# Patient Record
Sex: MALE | Race: ASIAN
[De-identification: names, ages, dates, MRNs, and addresses within clinical notes are randomized per-mention and may not be internally consistent; named-entity substitution may affect disease eponyms.]

---

## 2023-03-05 ENCOUNTER — FORM ENCOUNTER (OUTPATIENT)
Age: 58
End: 2023-03-05

## 2023-03-29 ENCOUNTER — APPOINTMENT (OUTPATIENT)
Dept: HEPATOLOGY | Facility: CLINIC | Age: 58
End: 2023-03-29

## 2023-03-29 NOTE — HISTORY OF PRESENT ILLNESS
[FreeTextEntry1] : \par *PRE-CHARTING\par \par RFR: Cirrhosis\par Referring MD:\par Med/oncology: Dr. Ryder Perla (9/2021)\par \par Tip Wynn is a 59 y/o female with Hep B cirrhosis?\par \par [Other Medical Hx]  HTN, HLD, T2DM, HBV\par [Surgical Hx]\par [Social Hx] \par Alcohol:                Tobacco:      \par illicit drug:            Herb and dietary Supplement:  \par [FMH of liver disease]\par \par [Labs]\par \par [Imaging]\par CT Abdomen 5/20/21 enlargement of lateral segment left lobe liver and caudate lobe and relative atrophy medial segment of left\par lobe liver which can be seen in early or mild cirrhosis, splenomegaly measuring 14.7cm\par \par Bone marrow biopsy 9/8/21 unremarkable  \par \par [Procedures]\par EGD:\par Colonoscopy:\par \par [Plan]\par \par # \par - Education and counseling were provided to the patient.\par - Discussed at length with the patient that next course of action would depend on results\par \par # Health Maintenance\par

## 2023-05-19 ENCOUNTER — FORM ENCOUNTER (OUTPATIENT)
Age: 58
End: 2023-05-19

## 2023-06-28 ENCOUNTER — APPOINTMENT (OUTPATIENT)
Dept: HEPATOLOGY | Facility: CLINIC | Age: 58
End: 2023-06-28

## 2023-06-28 NOTE — HISTORY OF PRESENT ILLNESS
[FreeTextEntry1] : RFR: F2, Chronic hepatitis B\par Referring MD: DAGOBERTO. Dr. 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 on Vemlidy who is referred by GI for Hep B management.\par Fibroscan 3/2022 showed F2,S0 (No kPa available)\par \par [Other Medical Hx] HLD, HTN, T2DM\par [Surgical Hx]\par [Social Hx] \par Alcohol:                \par Tobacco:      \par illicit drug:            \par Herb and dietary Supplement:  \par [FMH of liver disease]\par \par [Current Medications] \par Vemlidy 25 mg\par amlodipine 5 mg\par famotidine 40 mg\par Janumet XR 50-1,000 mg\par Jardiance 25 mg\par quetiapine 300 mg\par simvastatin 10 mg\par valsartan 40 mg\par \par [Labs]\par Bloodwork 02/28/2022: AST 17, ALT 21, , HBV BeAb Reactive, HBV BeAg Non-Reactive\par \par [Imaging]\par Ultrasound/ Fibroscan 03/02/2022: increased hepatic echogenicity. This is a nonspecific finding which may be on the basis of fatty infiltration. Other hepatic processes such as hepatic fibrosis or cirrhosis cannot be excluded. Splenomegaly. The presence of splenomegaly may indicate associated portal hypertension in patient with cirrhosis. F2,S0.\par

## 2023-07-18 ENCOUNTER — APPOINTMENT (OUTPATIENT)
Dept: HEPATOLOGY | Facility: CLINIC | Age: 58
End: 2023-07-18
Payer: MEDICAID

## 2023-07-18 VITALS — HEIGHT: 67 IN | BODY MASS INDEX: 24.33 KG/M2 | WEIGHT: 155 LBS

## 2023-07-18 VITALS
HEART RATE: 96 BPM | RESPIRATION RATE: 16 BRPM | TEMPERATURE: 98.2 F | OXYGEN SATURATION: 96 % | DIASTOLIC BLOOD PRESSURE: 87 MMHG | SYSTOLIC BLOOD PRESSURE: 131 MMHG

## 2023-07-18 DIAGNOSIS — Z00.00 ENCOUNTER FOR GENERAL ADULT MEDICAL EXAMINATION W/OUT ABNORMAL FINDINGS: ICD-10-CM

## 2023-07-18 PROCEDURE — 99204 OFFICE O/P NEW MOD 45 MIN: CPT

## 2023-07-30 LAB
AFP-TM SERPL-MCNC: 2.4 NG/ML
ALBUMIN SERPL ELPH-MCNC: 4.8 G/DL
ALP BLD-CCNC: 75 U/L
ALT SERPL-CCNC: 19 U/L
ANION GAP SERPL CALC-SCNC: 14 MMOL/L
AST SERPL-CCNC: 17 U/L
BILIRUB SERPL-MCNC: 0.7 MG/DL
BUN SERPL-MCNC: 10 MG/DL
CALCIUM SERPL-MCNC: 10 MG/DL
CHLORIDE SERPL-SCNC: 101 MMOL/L
CO2 SERPL-SCNC: 27 MMOL/L
CREAT SERPL-MCNC: 0.82 MG/DL
EGFR: 102 ML/MIN/1.73M2
FERRITIN SERPL-MCNC: 141 NG/ML
GGT SERPL-CCNC: 17 U/L
GLUCOSE SERPL-MCNC: 246 MG/DL
HCV AB SER QL: NONREACTIVE
HCV S/CO RATIO: 0.09 S/CO
HEPATITIS A IGG ANTIBODY: REACTIVE
HEPB DNA PCR INT: NOT DETECTED
HEPB DNA PCR LOG: NOT DETECTED LOGIU/ML
INR PPP: 0.92 RATIO
IRON SATN MFR SERPL: 30 %
IRON SERPL-MCNC: 97 UG/DL
POTASSIUM SERPL-SCNC: 3.9 MMOL/L
PROT SERPL-MCNC: 7.2 G/DL
PT BLD: 10.7 SEC
SODIUM SERPL-SCNC: 142 MMOL/L
TIBC SERPL-MCNC: 322 UG/DL
UIBC SERPL-MCNC: 225 UG/DL

## 2023-07-30 NOTE — REVIEW OF SYSTEMS
[Fever] : no fever [Chills] : no chills [Feeling Tired] : not feeling tired [Eye Pain] : no eye pain [Earache] : no earache [Chest Pain] : no chest pain [Palpitations] : no palpitations [Shortness Of Breath] : no shortness of breath [Abdominal Pain] : no abdominal pain [Vomiting] : no vomiting [Constipation] : no constipation [Diarrhea] : no diarrhea [Heartburn] : no heartburn [Melena] : no melena [Limb Swelling] : no limb swelling [Itching] : no itching [Confused] : no confusion [Dizziness] : no dizziness [Anxiety] : no anxiety [Easy Bleeding] : no tendency for easy bleeding

## 2023-07-30 NOTE — ADDENDUM
[FreeTextEntry1] : 7/18/23 MELD3.0=6 AFP 2.4 INR 0.92 WBC 9.29 Hb 16.4  Na 142 K 3.9 Cr 0.82 Alb 4.8 TB 0.7 AST/ALT 17/19 ALP 75 GGT 17 HBV DNA not detected Neg: Iron sat 30% Ferritin 141   # Health Maintenance Immune to Hep A Hep C Ab Negative

## 2023-07-30 NOTE — HISTORY OF PRESENT ILLNESS
[FreeTextEntry1] : RFR: Cirrhosis\par Referring MD: DAGOBERTO Mathews\par PCP: Dr. Elias Koch\par \par Tip Wynn is a 58y male with HBeAg-Negative chronic hepatitis B on Vemlidy who is referred by Dr. Mathews for possible cirrhosis (splenomegaly) on ultrasound. Hep B was diagnosed in his 20s. Vemlidy started 5 years ago as a first Hep B pill. \par \par - Ultrasound elastography showed F2.\par - \par - EGD in 2017 no EV\par - Today he reports feeling well and denies any physical complaints. No signs of advanced liver disease on PE.\par \par [Other Medical Hx] HTN, HLD, DM\par [Surgical Hx] None\par [Social Hx]\par Alcohol: heavy ETOH in 20s, \par Tobacco: Quit  \par illicit drug: Denies \par Herb and dietary Supplement: Denies\par [FMH of liver disease] None\par (possibly pt's father  from ETOH cirrhosis c/b EV bleed)\par  \par [Medications]\par amlodipine 5 mg\par famotidine 40 mg\par Janumet XR 50-1,000 mg\par Jardiance 25 mg\par quetiapine 300 mg\par simvastatin 10 mg\par valsartan 40 mg\par Vemlidy 25 mg\par \par [Labs]\par  2022 \par AST 17, ALT 21, , HBV BeAb Reactive, \par HBV BeAg Non-Reactive\par \par [Imaging]\par Ultrasound/ Fibroscan done on 2022: increased hepatic echogenicity. This is a nonspecific finding which may be on the basis of fatty infiltration. Other hepatic processes such as hepatic fibrosis or cirrhosis cannot be excluded. Splenomegaly. The presence of splenomegaly may indicate associated portal hypertension in patient with cirrhosis. F2,S0.\par \par [Procedures]\par EGD, 9/15/2017 Dr. Mathews: Normal mucosa in the upper third of the esophagus, middle third of the esophagus and lower third of the esophagus. (Biopsy), Erythema in the antrum compatible with gastritis. (Biopsy), Erythema in the stomach body. (Biopsy) and Normal mucosa in the duodenal bulb and second part of the duodenum. (Biopsy)\par EGD, ~10 years ago, history of H. pylori s/p treatment\par \par Colonoscopy 2017: polypectomy \par

## 2023-07-30 NOTE — ASSESSMENT
Verified Results  TSH 28Mar2018 12:01AM YAAERMELINDA VICTOR MJASPREETMARTINA   [Mar 29, 2018 9:19PM KAYCEE VICTOR MJASPREETMARTINA]  Thyroid levels are clinically normal. Continue current dose of levothyroxine. WIll need repeat TSH and Free T4 in 6-9 months. This is to be done by adult endocrinology as discussed at visit.     Test Name Result Flag Reference   TSH 1.052 mcUnits/mL  0.463-4.130     T4, FREE 28Mar2018 12:01AM WALE BENOITMARU   [Mar 29, 2018 9:19PM KAYCEE VICTOR MJASPREETMARTINA]  Thyroid levels are clinically normal. Continue current dose of levothyroxine. WIll need repeat TSH and Free T4 in 6-9 months. This is to be done by adult endocrinology as discussed at visit.     Test Name Result Flag Reference   FREE T4 1.1 ng/dl  0.8-1.3       
[FreeTextEntry1] : [Plan]  # HBeAg-Negative chronic hepatitis B  # Splenomegaly # Recovered from ETOH liver disease in his 20s  Age 58 blood type A Will check MELD  - Continue Vemlidy, Refilled by Dr. Humphreys & Dr. Mathews - Education and counseling were provided to the patient. - Discussed at length with the patient that next course of action would depend on results - Emphasized the importance of compliance with medications and HCC screening - MRI to check hepatic portal vein. - Labs today - RTO in a month  # Health Maintenance   - Screen Hep A and C  * See the section of addendum below

## 2023-07-30 NOTE — PHYSICAL EXAM
[Splenomegaly] : splenomegaly [Non-Tender] : non-tender [General Appearance - Alert] : alert [General Appearance - In No Acute Distress] : in no acute distress [Sclera] : the sclera and conjunctiva were normal [Outer Ear] : the ears and nose were normal in appearance [] : no respiratory distress [Abdomen Tenderness] : non-tender [Abdomen Hernia] : no hernia was discovered [Abnormal Walk] : normal gait [Skin Color & Pigmentation] : normal skin color and pigmentation [Oriented To Time, Place, And Person] : oriented to person, place, and time [Impaired Insight] : insight and judgment were intact [Affect] : the affect was normal [Scleral Icterus] : No Scleral Icterus [Spider Angioma] : No spider angioma(s) were observed [Abdominal  Ascites] : no ascites [Jaundice] : No jaundice [Palmar Erythema] : no Palmar Erythema

## 2023-08-09 ENCOUNTER — OUTPATIENT (OUTPATIENT)
Dept: OUTPATIENT SERVICES | Facility: HOSPITAL | Age: 58
LOS: 1 days | End: 2023-08-09
Payer: MEDICAID

## 2023-08-09 ENCOUNTER — RESULT REVIEW (OUTPATIENT)
Age: 58
End: 2023-08-09

## 2023-08-09 ENCOUNTER — APPOINTMENT (OUTPATIENT)
Dept: MRI IMAGING | Facility: IMAGING CENTER | Age: 58
End: 2023-08-09

## 2023-08-09 DIAGNOSIS — Z00.8 ENCOUNTER FOR OTHER GENERAL EXAMINATION: ICD-10-CM

## 2023-08-09 PROCEDURE — A9585: CPT

## 2023-08-09 PROCEDURE — 74183 MRI ABD W/O CNTR FLWD CNTR: CPT

## 2023-08-09 PROCEDURE — 74183 MRI ABD W/O CNTR FLWD CNTR: CPT | Mod: 26

## 2023-08-22 ENCOUNTER — APPOINTMENT (OUTPATIENT)
Dept: HEPATOLOGY | Facility: CLINIC | Age: 58
End: 2023-08-22
Payer: MEDICAID

## 2023-08-22 DIAGNOSIS — R16.1 SPLENOMEGALY, NOT ELSEWHERE CLASSIFIED: ICD-10-CM

## 2023-08-22 PROCEDURE — 99214 OFFICE O/P EST MOD 30 MIN: CPT

## 2023-08-27 NOTE — ASSESSMENT
[FreeTextEntry1] : [Plan]  # HBeAg-Negative chronic hepatitis B  # Splenomegaly # Recovered from ETOH liver disease in his 20s  Age 58 blood type A Will check MELD  - Continue Vemlidy, Refilled by Dr. Humphreys & Dr. Mathews - Education and counseling were provided to the patient. - Discussed at length with the patient that next course of action would depend on results - Emphasized the importance of compliance with medications and HCC screening  MELD 3.0= 6 Labs in Feb 2024 Us abd in Feb 2024 RTO in  Feb 2024  # Health Maintenance Immune to Hep A Hep C Ab Negative

## 2024-02-14 ENCOUNTER — APPOINTMENT (OUTPATIENT)
Dept: HEPATOLOGY | Facility: CLINIC | Age: 59
End: 2024-02-14
Payer: MEDICAID

## 2024-02-14 VITALS
SYSTOLIC BLOOD PRESSURE: 129 MMHG | RESPIRATION RATE: 16 BRPM | TEMPERATURE: 97.5 F | BODY MASS INDEX: 23.96 KG/M2 | WEIGHT: 153 LBS | OXYGEN SATURATION: 95 % | DIASTOLIC BLOOD PRESSURE: 80 MMHG | HEART RATE: 90 BPM

## 2024-02-14 DIAGNOSIS — K74.00 HEPATIC FIBROSIS, UNSPECIFIED: ICD-10-CM

## 2024-02-14 DIAGNOSIS — K74.60 UNSPECIFIED CIRRHOSIS OF LIVER: ICD-10-CM

## 2024-02-14 PROCEDURE — 99214 OFFICE O/P EST MOD 30 MIN: CPT

## 2024-02-14 NOTE — PHYSICAL EXAM
[Splenomegaly] : splenomegaly [Non-Tender] : non-tender [General Appearance - Alert] : alert [General Appearance - In No Acute Distress] : in no acute distress [] : no respiratory distress [Abdomen Tenderness] : non-tender [Abnormal Walk] : normal gait [Abdomen Hernia] : no hernia was discovered [Skin Color & Pigmentation] : normal skin color and pigmentation [Oriented To Time, Place, And Person] : oriented to person, place, and time [Impaired Insight] : insight and judgment were intact [Affect] : the affect was normal [Scleral Icterus] : No Scleral Icterus [Abdominal  Ascites] : no ascites [Spider Angioma] : No spider angioma(s) were observed [Asterixis] : no asterixis observed [Jaundice] : No jaundice [Palmar Erythema] : no Palmar Erythema [Apical Impulse] : the apical impulse was normal [Heart Rate And Rhythm] : heart rate was normal and rhythm regular [Edema] : there was no peripheral edema [Bowel Sounds] : normal bowel sounds [Abdomen Soft] : soft [No Focal Deficits] : no focal deficits

## 2024-02-14 NOTE — HISTORY OF PRESENT ILLNESS
[FreeTextEntry1] : GI: Dr. Dominic Mathews PCP: Dr. Alyssa Koch  Mr. Wynn is a 60 yo male with hx of heavy ETOH use in his 20s and HBV cirrhosis on TAF, here for f/u care.  MRI of abdomen in 2023- cirrhosis and splenomegaly; F2, S0 on ultrasound elastography from 3/2022  EGD 23: Grade A esophagitis (refulux esophagitis), Erythema in the antrum (mod gastritis) Erythema in the stomach body. normal mucosa in the duodenal bulb and second part of the duodenum. Started omeprazole 40mg daily PRN. Patho-unremarkable.   Interim hx  - Last visit was 23  - TAF, taking as directed. med being filled by PCP  - US abd was done [facility] 1 week ago, report was not sent over  - Labs were done w/ PCP Dr. Koch, was told that "there are no issues. even with my liver numbers." Need to obtain reports  - "I feel healthier than before"  - Denies excessive fatigue, chest pain, SOB, palpitations, lightheadedness/ dizziness, melena, unintentional weight loss, acute hospitalizations or ER visits. Afebrile today. Appetite is good. Today he reports feeling well and denies any physical complaints. No signs of advanced liver disease on PE.  [Other Medical Hx] HTN, HLD, DM [Surgical Hx] None [Social Hx] Alcohol: heavy ETOH in 20s,  Tobacco: Quit   illicit drug: Denies  Herb and dietary Supplement: Denies [FMH of liver disease] None (possibly pt's father  from ETOH cirrhosis c/b EV bleed)   [Medications] amlodipine 5 mg omeprazole 40 mg PRN  Janumet XR 50-1,000 mg Jardiance 25 mg simvastatin 10 mg valsartan 40 mg Vemlidy 25 mg  [Labs] - Had b/w done 1-2 weeks ago, was told results were normal. Report not available  23 MELD3.0=6 AFP 2.4 INR 0.92 WBC 9.29 Hb 16.4  Na 142 K 3.9 Cr 0.82 Alb 4.8 TB 0.7 AST/ALT / ALP 75 GGT 17 HBV DNA not detected Neg: Iron sat 30% Ferritin 141  2022  AST 17, ALT 21, , HBV BeAb Reactive,  HBV BeAg Non-Reactive  [Imaging] Ultrasound/ Fibroscan done on 2022: increased hepatic echogenicity. This is a nonspecific finding which may be on the basis of fatty infiltration. Other hepatic processes such as hepatic fibrosis or cirrhosis cannot be excluded. Splenomegaly. The presence of splenomegaly may indicate associated portal hypertension in patient with cirrhosis. F2,S0.  [Procedures] EGD, 9/15/2017 Dr. Mathews: Normal mucosa in the upper third of the esophagus, middle third of the esophagus and lower third of the esophagus. (Biopsy), Erythema in the antrum compatible with gastritis. (Biopsy), Erythema in the stomach body. (Biopsy) and Normal mucosa in the duodenal bulb and second part of the duodenum. (Biopsy)  EGD, ~10 years ago, history of H. pylori s/p treatment  Colonoscopy 2017: polypectomy

## 2024-02-14 NOTE — ASSESSMENT
[FreeTextEntry1] : Mr. Wynn is a 59 y, w/ hx of heavy ETOH use in his 20s and HBV cirrhosis.   ABO: A  MELD 3.0 = 6 (7/2023) *need updated b/w report from PCP*   # HBeAg-Negative chronic hepatitis B  - On TAF, med being filled by PCP  - Splenomegaly on 3/2022 ABD US  - Recovered from ETOH liver disease in his 20s  - Continue Vemlidy, Refilled by Dr. Humphreys & Dr. Mathews - Education and counseling were provided to the patient. - Discussed at length with the patient that next course of action would depend on results - Emphasized the importance of compliance with medications and HCC screening  # Health Maintenance Immune to Hep A Hep C Ab Negative  [Plan]  - Will request recent bloodwork and ABD US from PCP and [facility]  - Obtain MRI in 6 mo for HCC screening - Repeat bloodwork in 6 mo - RTO in 6 mo after bloodwork and MRI   Will call patient and speak with him to review recent labs and ABD US and intervene as necessary.   Plans & discussions received well by patient. No outstanding questions or concerns at conclusion of visit. Patient was educated and informed on monitoring for s&s of worsening liver disease such as confusion, overt bleeding, vomiting blood, fluid buildup in abdomen (ascites), easy bruising, or fatigue. Encouraged to present to ER in the event of the above occurring and to call the office to notify. Patient provided the phone number for ALP to address questions/ concerns that may arise in the interim and instructed on how to contact language appropriate staff member.

## 2024-02-14 NOTE — REVIEW OF SYSTEMS
[Fever] : no fever [Chills] : no chills [Feeling Tired] : not feeling tired [Eye Pain] : no eye pain [Earache] : no earache [Chest Pain] : no chest pain [Palpitations] : no palpitations [Shortness Of Breath] : no shortness of breath [Abdominal Pain] : no abdominal pain [Vomiting] : no vomiting [Constipation] : no constipation [Diarrhea] : no diarrhea [Heartburn] : no heartburn [Melena] : no melena [Limb Swelling] : no limb swelling [Itching] : no itching [Confused] : no confusion [Dizziness] : no dizziness [Easy Bleeding] : no tendency for easy bleeding [Anxiety] : no anxiety

## 2024-02-22 ENCOUNTER — NON-APPOINTMENT (OUTPATIENT)
Age: 59
End: 2024-02-22

## 2024-02-23 LAB
AFP-TM SERPL-MCNC: 2.4 NG/ML
ALBUMIN SERPL ELPH-MCNC: 4.8 G/DL
ALP BLD-CCNC: 70 U/L
ALT SERPL-CCNC: 21 U/L
ANION GAP SERPL CALC-SCNC: 15 MMOL/L
AST SERPL-CCNC: 19 U/L
BILIRUB SERPL-MCNC: 0.6 MG/DL
BUN SERPL-MCNC: 17 MG/DL
CALCIUM SERPL-MCNC: 9.9 MG/DL
CHLORIDE SERPL-SCNC: 99 MMOL/L
CO2 SERPL-SCNC: 28 MMOL/L
CREAT SERPL-MCNC: 0.85 MG/DL
EGFR: 101 ML/MIN/1.73M2
GGT SERPL-CCNC: 12 U/L
GLUCOSE SERPL-MCNC: 263 MG/DL
HEPB DNA PCR INT: NOT DETECTED
HEPB DNA PCR LOG: NOT DETECTED LOGIU/ML
INR PPP: 0.81 RATIO
POTASSIUM SERPL-SCNC: 4.6 MMOL/L
PROT SERPL-MCNC: 7.4 G/DL
PT BLD: 9.3 SEC
SODIUM SERPL-SCNC: 141 MMOL/L

## 2024-05-16 DIAGNOSIS — B19.10 UNSPECIFIED VIRAL HEPATITIS B W/OUT HEPATIC COMA: ICD-10-CM

## 2024-05-16 RX ORDER — TENOFOVIR ALAFENAMIDE 25 MG/1
25 TABLET ORAL
Qty: 90 | Refills: 3 | Status: ACTIVE | COMMUNITY
Start: 2024-05-16 | End: 1900-01-01

## 2024-08-14 ENCOUNTER — APPOINTMENT (OUTPATIENT)
Dept: HEPATOLOGY | Facility: CLINIC | Age: 59
End: 2024-08-14

## 2024-08-14 VITALS
DIASTOLIC BLOOD PRESSURE: 77 MMHG | RESPIRATION RATE: 18 BRPM | OXYGEN SATURATION: 95 % | HEART RATE: 89 BPM | WEIGHT: 147 LBS | BODY MASS INDEX: 23.02 KG/M2 | SYSTOLIC BLOOD PRESSURE: 111 MMHG | TEMPERATURE: 97.7 F

## 2024-08-14 DIAGNOSIS — K74.60 UNSPECIFIED CIRRHOSIS OF LIVER: ICD-10-CM

## 2024-08-14 DIAGNOSIS — B19.10 UNSPECIFIED VIRAL HEPATITIS B W/OUT HEPATIC COMA: ICD-10-CM

## 2024-08-14 DIAGNOSIS — R16.1 SPLENOMEGALY, NOT ELSEWHERE CLASSIFIED: ICD-10-CM

## 2024-08-14 LAB
ALBUMIN SERPL ELPH-MCNC: 4.6 G/DL
ALP BLD-CCNC: 67 U/L
ALT SERPL-CCNC: 12 U/L
ANION GAP SERPL CALC-SCNC: 16 MMOL/L
AST SERPL-CCNC: 15 U/L
BASOPHILS # BLD AUTO: 0.09 K/UL
BASOPHILS NFR BLD AUTO: 1.2 %
BILIRUB SERPL-MCNC: 0.6 MG/DL
BUN SERPL-MCNC: 11 MG/DL
CALCIUM SERPL-MCNC: 9.4 MG/DL
CHLORIDE SERPL-SCNC: 101 MMOL/L
CO2 SERPL-SCNC: 26 MMOL/L
CREAT SERPL-MCNC: 0.79 MG/DL
EGFR: 103 ML/MIN/1.73M2
EOSINOPHIL # BLD AUTO: 0.48 K/UL
EOSINOPHIL NFR BLD AUTO: 6.2 %
GLUCOSE SERPL-MCNC: 148 MG/DL
HCT VFR BLD CALC: 43.7 %
HGB BLD-MCNC: 15.4 G/DL
IMM GRANULOCYTES NFR BLD AUTO: 0.5 %
LYMPHOCYTES # BLD AUTO: 2.1 K/UL
LYMPHOCYTES NFR BLD AUTO: 26.9 %
MAN DIFF?: NORMAL
MCHC RBC-ENTMCNC: 30.9 PG
MCHC RBC-ENTMCNC: 35.2 GM/DL
MCV RBC AUTO: 87.8 FL
MONOCYTES # BLD AUTO: 0.51 K/UL
MONOCYTES NFR BLD AUTO: 6.5 %
NEUTROPHILS # BLD AUTO: 4.58 K/UL
NEUTROPHILS NFR BLD AUTO: 58.7 %
PLATELET # BLD AUTO: 199 K/UL
POTASSIUM SERPL-SCNC: 3.8 MMOL/L
PROT SERPL-MCNC: 6.7 G/DL
RBC # BLD: 4.98 M/UL
RBC # FLD: 12.3 %
SODIUM SERPL-SCNC: 143 MMOL/L
WBC # FLD AUTO: 7.8 K/UL

## 2024-08-14 PROCEDURE — 99214 OFFICE O/P EST MOD 30 MIN: CPT

## 2024-08-14 NOTE — HISTORY OF PRESENT ILLNESS
[FreeTextEntry1] : GI: Dr. Dominic Mathews PCP: Dr. Alyssa Koch  Russellgeronimo Marino Wynn is a 59y male with cirrhosis 2/2 Chronic Hep B (on Vemlidy) and  hx of heavy ETOH use in his 20s who presents for the follow-up. Pt also has HTN, HLD, and DM.  No HE No EV on last EGD  No ascites  splenomegaly MELD = 6 (24) AST/ALT  HBV PVR ND   [Interim Hx] Glucose >200, pt has diabetes. Advised him to see PCP to manage DM better or check A1C Rec: strict diabetes diet.  DM????? when colonosocpy????  Today he reports feeling well and no physical complaints.   [Medical Hx] as above [Surgical Hx] None [Social Hx] Alcohol: heavy ETOH in 20s, Tobacco: Quit  illicit drug: Denies Herb and dietary Supplement: Denies [FMH of liver disease] possibly pt's father  from ETOH cirrhosis c/b EV bleed  [Medications] amlodipine 5 mg omeprazole 40 mg PRN Janumet XR 50-1,000 mg Jardiance 25 mg simvastatin 10 mg valsartan 40 mg Vemlidy 25 mg  [Labs] 24  AST/ALT  HBV PVR ND    23 MELD3.0=6  [Imaigng] MRI w/wo EOVIST 24: Liver, enlarged. No fat, No cirrhosis. No suspicious hepatic mass. Portal and hepatic veins are patent. Mild splenomegaly. otherwise wnl.   Ultrasound/ Fibroscan done on 2022: increased hepatic echogenicity. This is a nonspecific finding which may be on the basis of fatty infiltration. Other hepatic processes such as hepatic fibrosis or cirrhosis cannot be excluded. Splenomegaly. The presence of splenomegaly may indicate associated portal hypertension in patient with cirrhosis. F2,S0.  [Procedures] EGD 2023 by Dr. Mathews: grade A esophagitis, erythema in the antrum and stomach body.   Colonoscopy:   [Assessment]   59y male with  cirrhosis 2/2 Chronic Hep B (on Vemlidy) and  hx of heavy ETOH use in his 20s who presents for the follow-up. Pt also has HTN, HLD, and DM.  [Plan]  # Compensated cirrhosis  Age 59 ABO: A per pt MELD = 6 (2/14/24) No HE No EV on last EGD  No ascites -  splenomegaly - likely Recovered from ETOH liver disease in his 20s - Education and counseling were provided to the patient.  # Chronic Hep B (on Vemlidy) - Continue Vemlidy - Emphasized the importance of compliance with medications and HCC screening - HCC screening every 6 months  # HTN, HLD, and DM # hx of heavy ETOH use in his 20s  - Continue to f/u with PCP to manage metabolic diseases  # Health Maintenance - Immune to Hep A - Hep C Ab Negative  Labs today (CBC, CMP, HBV) and 2024 (MELD labs/AFP) US abd 2024 to screen HCC RTO in 2024

## 2024-08-15 LAB
HBV DNA # SERPL NAA+PROBE: <10 IU/ML
HEPB DNA PCR INT: DETECTED
HEPB DNA PCR LOG: <1 LOGIU/ML

## 2025-05-06 NOTE — HISTORY OF PRESENT ILLNESS
[FreeTextEntry1] : - Ultrasound elastography showed F2. -  - EGD in 2017 no EV - Today he reports feeling well and denies any physical complaints. No signs of advanced liver disease on PE.  [Other Medical Hx] HTN, HLD, DM [Surgical Hx] None [Social Hx] Alcohol: heavy ETOH in 20s,  Tobacco: Quit   illicit drug: Denies  Herb and dietary Supplement: Denies [FMH of liver disease] None (possibly pt's father  from ETOH cirrhosis c/b EV bleed)   [Medications] amlodipine 5 mg famotidine 40 mg Janumet XR 50-1,000 mg Jardiance 25 mg quetiapine 300 mg simvastatin 10 mg valsartan 40 mg Vemlidy 25 mg  [Labs] 23 MELD3.0=6 AFP 2.4 INR 0.92 WBC 9.29 Hb 16.4  Na 142 K 3.9 Cr 0.82 Alb 4.8 TB 0.7 AST/ALT / ALP 75 GGT 17 HBV DNA not detected Neg: Iron sat 30% Ferritin 141  2022  AST 17, ALT 21, , HBV BeAb Reactive,  HBV BeAg Non-Reactive  [Imaging] Ultrasound/ Fibroscan done on 2022: increased hepatic echogenicity. This is a nonspecific finding which may be on the basis of fatty infiltration. Other hepatic processes such as hepatic fibrosis or cirrhosis cannot be excluded. Splenomegaly. The presence of splenomegaly may indicate associated portal hypertension in patient with cirrhosis. F2,S0.  [Procedures]  EGD, 9/15/2017 Dr. Mathews: Normal mucosa in the upper third of the esophagus, middle third of the esophagus and lower third of the esophagus. (Biopsy), Erythema in the antrum compatible with gastritis. (Biopsy), Erythema in the stomach body. (Biopsy) and Normal mucosa in the duodenal bulb and second part of the duodenum. (Biopsy) EGD, ~10 years ago, history of H. pylori s/p treatment  Colonoscopy 2017: polypectomy  
None